# Patient Record
Sex: FEMALE | Race: WHITE
[De-identification: names, ages, dates, MRNs, and addresses within clinical notes are randomized per-mention and may not be internally consistent; named-entity substitution may affect disease eponyms.]

---

## 2018-01-25 ENCOUNTER — HOSPITAL ENCOUNTER (OUTPATIENT)
Dept: HOSPITAL 17 - ESDC | Age: 43
Discharge: HOME | End: 2018-01-25
Attending: SURGERY
Payer: COMMERCIAL

## 2018-01-25 DIAGNOSIS — K25.9: ICD-10-CM

## 2018-01-25 DIAGNOSIS — K21.9: Primary | ICD-10-CM

## 2018-01-25 PROCEDURE — 43239 EGD BIOPSY SINGLE/MULTIPLE: CPT

## 2018-01-25 PROCEDURE — 00731 ANES UPR GI NDSC PX NOS: CPT

## 2018-01-25 PROCEDURE — 88305 TISSUE EXAM BY PATHOLOGIST: CPT

## 2018-01-25 NOTE — GIPROC
[facility]

[address] [facility], [address]

 

 

EGD PROCEDURE REPORT     EXAM DATE: 01/25/2018

 

PATIENT NAME:      Edgar Finney           MR #:      [id number]

YOB: 1975      VISIT #:     [id number]

ATTENDING:     Andi Calderón MD     ORDER #:     [id number]

ASSISTANT:      none          STATUS:     outpatient

 

INDICATIONS:  The patient is a 42 yr old female here for an EGD due to follow up

on ulcer and history of esophageal reflux. Hx of egd 4 years ago.

PROCEDURE PERFORMED:     EGD w/ biopsy

MEDICATIONS:     None and Per Anesthesia.

TOPICAL ANESTHETIC:     none

 

CONSENT: The patient understands the risks and benefits of the procedure and

understands that these risks include, but are not limited to: sedation,

allergic reaction, infection, perforation and/or bleeding. Alternative means of

evaluation and treatment include, among others: physical exam, x-rays, and/or

surgical intervention. The patient elects to proceed with this endoscopic

procedure.

 



medical equipment was checked for proper function. Hand hygiene and appropriate

measures for infection prevention was taken. After the risks, benefits and

alternatives of the procedure were thoroughly explained, Informed consent was

verified, confirmed and timeout was successfully executed by the treatment

team. The patient was anesthetized with topical anesthesia and the EC-2990i

([id number]) endoscope was introduced through the mouth and advanced to the second

portion of the duodenum.  Retroflexed views revealed no abnormalities  The

gastroscope was then slowly withdrawn and removed.

No evidence of ulcer .

 

 

 

ADVERSE EVENTS:     There were no complications.

IMPRESSIONS:     Retroflexed views revealed no abnormalities

 

RECOMMENDATIONS:     Await biopsy results.  Biopsy results will not be ready for

7-10 days.  If you don't hear from us in two weeks, call our office for biopsy

results.

PATIENT CONDITION:     fair

DISPOSITION:     Home

REPEAT EXAM:     NONE

 

 

___________________________________

Andi Calderón MD

eSigned:  Andi Calderón MD 01/25/2018 9:39 AM

 

 

cc: Andi Calderón MD

## 2018-03-19 ENCOUNTER — HOSPITAL ENCOUNTER (INPATIENT)
Dept: HOSPITAL 17 - HSDI | Age: 43
LOS: 1 days | Discharge: HOME | DRG: 621 | End: 2018-03-20
Attending: SURGERY | Admitting: SURGERY
Payer: COMMERCIAL

## 2018-03-19 VITALS — HEIGHT: 64 IN | WEIGHT: 258.6 LBS | BODY MASS INDEX: 44.15 KG/M2

## 2018-03-19 VITALS
TEMPERATURE: 96.7 F | DIASTOLIC BLOOD PRESSURE: 79 MMHG | OXYGEN SATURATION: 100 % | HEART RATE: 72 BPM | SYSTOLIC BLOOD PRESSURE: 124 MMHG | RESPIRATION RATE: 18 BRPM

## 2018-03-19 VITALS
OXYGEN SATURATION: 98 % | RESPIRATION RATE: 18 BRPM | HEART RATE: 70 BPM | TEMPERATURE: 98.7 F | DIASTOLIC BLOOD PRESSURE: 74 MMHG | SYSTOLIC BLOOD PRESSURE: 123 MMHG

## 2018-03-19 VITALS — OXYGEN SATURATION: 98 %

## 2018-03-19 DIAGNOSIS — E66.01: Primary | ICD-10-CM

## 2018-03-19 DIAGNOSIS — K21.9: ICD-10-CM

## 2018-03-19 DIAGNOSIS — R73.03: ICD-10-CM

## 2018-03-19 DIAGNOSIS — M06.9: ICD-10-CM

## 2018-03-19 DIAGNOSIS — E78.00: ICD-10-CM

## 2018-03-19 DIAGNOSIS — Z23: ICD-10-CM

## 2018-03-19 PROCEDURE — 90732 PPSV23 VACC 2 YRS+ SUBQ/IM: CPT

## 2018-03-19 PROCEDURE — 0D164ZA BYPASS STOMACH TO JEJUNUM, PERCUTANEOUS ENDOSCOPIC APPROACH: ICD-10-PCS | Performed by: SURGERY

## 2018-03-19 PROCEDURE — 90686 IIV4 VACC NO PRSV 0.5 ML IM: CPT

## 2018-03-19 PROCEDURE — 80048 BASIC METABOLIC PNL TOTAL CA: CPT

## 2018-03-19 PROCEDURE — 83735 ASSAY OF MAGNESIUM: CPT

## 2018-03-19 PROCEDURE — 94150 VITAL CAPACITY TEST: CPT

## 2018-03-19 PROCEDURE — 82948 REAGENT STRIP/BLOOD GLUCOSE: CPT

## 2018-03-19 PROCEDURE — 85025 COMPLETE CBC W/AUTO DIFF WBC: CPT

## 2018-03-19 RX ADMIN — POTASSIUM CHLORIDE, DEXTROSE MONOHYDRATE AND SODIUM CHLORIDE SCH MLS/HR: 150; 5; 450 INJECTION, SOLUTION INTRAVENOUS at 10:30

## 2018-03-19 RX ADMIN — Medication SCH ML: at 21:00

## 2018-03-19 RX ADMIN — SODIUM CHLORIDE SCH MG: 900 INJECTION, SOLUTION INTRAVENOUS at 20:16

## 2018-03-19 RX ADMIN — POTASSIUM CHLORIDE, DEXTROSE MONOHYDRATE AND SODIUM CHLORIDE SCH MLS/HR: 150; 5; 450 INJECTION, SOLUTION INTRAVENOUS at 18:38

## 2018-03-19 RX ADMIN — SODIUM CHLORIDE SCH MLS/HR: 900 INJECTION, SOLUTION INTRAVENOUS at 16:47

## 2018-03-19 RX ADMIN — ACETAMINOPHEN SCH MLS/HR: 10 INJECTION, SOLUTION INTRAVENOUS at 18:28

## 2018-03-19 RX ADMIN — ACETAMINOPHEN SCH MLS/HR: 10 INJECTION, SOLUTION INTRAVENOUS at 11:34

## 2018-03-19 RX ADMIN — PANTOPRAZOLE SCH MG: 40 TABLET, DELAYED RELEASE ORAL at 16:02

## 2018-03-19 RX ADMIN — SODIUM CHLORIDE SCH MG: 900 INJECTION, SOLUTION INTRAVENOUS at 16:03

## 2018-03-19 NOTE — HHI.PR
__________________________________________________





Immediate Post Op Note


Procedure Date:


Mar 19, 2018


Pre Op Diagnosis:  


morbid obesity, bmi 44, hypercholestremia, gerd, PreDM, RA


Post Op Diagnosis:  


same


Surgeon:


Andi Calderón MD


Assistant(s):


Dr. Ortiz


Procedure:


lap RYGB


Findings:


no leak with methylene blue, redundant GJ tissue resected


Complications:


none


Specimen(s) removed:


none


Estimated blood loss:


10cc


Anesthesia:  General


Drains:  None


Patient to:  PACU


Patient Condition:  Good











Andi Calderón MD Mar 19, 2018 09:43

## 2018-03-20 VITALS
TEMPERATURE: 96.8 F | HEART RATE: 74 BPM | SYSTOLIC BLOOD PRESSURE: 134 MMHG | OXYGEN SATURATION: 97 % | DIASTOLIC BLOOD PRESSURE: 73 MMHG | RESPIRATION RATE: 18 BRPM

## 2018-03-20 VITALS
DIASTOLIC BLOOD PRESSURE: 84 MMHG | RESPIRATION RATE: 18 BRPM | TEMPERATURE: 98.4 F | SYSTOLIC BLOOD PRESSURE: 139 MMHG | OXYGEN SATURATION: 96 % | HEART RATE: 74 BPM

## 2018-03-20 VITALS
OXYGEN SATURATION: 95 % | HEART RATE: 73 BPM | SYSTOLIC BLOOD PRESSURE: 117 MMHG | DIASTOLIC BLOOD PRESSURE: 65 MMHG | TEMPERATURE: 98.3 F | RESPIRATION RATE: 18 BRPM

## 2018-03-20 VITALS
RESPIRATION RATE: 18 BRPM | TEMPERATURE: 98.8 F | SYSTOLIC BLOOD PRESSURE: 140 MMHG | HEART RATE: 65 BPM | OXYGEN SATURATION: 97 % | DIASTOLIC BLOOD PRESSURE: 76 MMHG

## 2018-03-20 VITALS
RESPIRATION RATE: 18 BRPM | OXYGEN SATURATION: 96 % | DIASTOLIC BLOOD PRESSURE: 73 MMHG | HEART RATE: 81 BPM | SYSTOLIC BLOOD PRESSURE: 128 MMHG | TEMPERATURE: 99.9 F

## 2018-03-20 LAB
BASOPHILS # BLD AUTO: 0 TH/MM3 (ref 0–0.2)
BASOPHILS NFR BLD: 0.1 % (ref 0–2)
BUN SERPL-MCNC: 3 MG/DL (ref 7–18)
CALCIUM SERPL-MCNC: 8.5 MG/DL (ref 8.5–10.1)
CHLORIDE SERPL-SCNC: 104 MEQ/L (ref 98–107)
CREAT SERPL-MCNC: 0.64 MG/DL (ref 0.5–1)
EOSINOPHIL # BLD: 0 TH/MM3 (ref 0–0.4)
EOSINOPHIL NFR BLD: 0.1 % (ref 0–4)
ERYTHROCYTE [DISTWIDTH] IN BLOOD BY AUTOMATED COUNT: 12.5 % (ref 11.6–17.2)
GFR SERPLBLD BASED ON 1.73 SQ M-ARVRAT: 102 ML/MIN (ref 89–?)
GLUCOSE SERPL-MCNC: 125 MG/DL (ref 74–106)
HCO3 BLD-SCNC: 21.8 MEQ/L (ref 21–32)
HCT VFR BLD CALC: 37.9 % (ref 35–46)
HGB BLD-MCNC: 12.6 GM/DL (ref 11.6–15.3)
LYMPHOCYTES # BLD AUTO: 1.3 TH/MM3 (ref 1–4.8)
LYMPHOCYTES NFR BLD AUTO: 11.5 % (ref 9–44)
MAGNESIUM SERPL-MCNC: 2.1 MG/DL (ref 1.5–2.5)
MCH RBC QN AUTO: 31.2 PG (ref 27–34)
MCHC RBC AUTO-ENTMCNC: 33.1 % (ref 32–36)
MCV RBC AUTO: 94.4 FL (ref 80–100)
MONOCYTE #: 0.8 TH/MM3 (ref 0–0.9)
MONOCYTES NFR BLD: 6.9 % (ref 0–8)
NEUTROPHILS # BLD AUTO: 9.5 TH/MM3 (ref 1.8–7.7)
NEUTROPHILS NFR BLD AUTO: 81.4 % (ref 16–70)
PLATELET # BLD: 248 TH/MM3 (ref 150–450)
PMV BLD AUTO: 9.1 FL (ref 7–11)
RBC # BLD AUTO: 4.02 MIL/MM3 (ref 4–5.3)
SODIUM SERPL-SCNC: 135 MEQ/L (ref 136–145)
WBC # BLD AUTO: 11.7 TH/MM3 (ref 4–11)

## 2018-03-20 RX ADMIN — SODIUM CHLORIDE SCH MLS/HR: 900 INJECTION, SOLUTION INTRAVENOUS at 08:25

## 2018-03-20 RX ADMIN — SODIUM CHLORIDE SCH MG: 900 INJECTION, SOLUTION INTRAVENOUS at 08:28

## 2018-03-20 RX ADMIN — POTASSIUM CHLORIDE, DEXTROSE MONOHYDRATE AND SODIUM CHLORIDE SCH MLS/HR: 150; 5; 450 INJECTION, SOLUTION INTRAVENOUS at 03:50

## 2018-03-20 RX ADMIN — SODIUM CHLORIDE SCH MG: 900 INJECTION, SOLUTION INTRAVENOUS at 03:50

## 2018-03-20 RX ADMIN — SODIUM CHLORIDE SCH MLS/HR: 900 INJECTION, SOLUTION INTRAVENOUS at 01:32

## 2018-03-20 RX ADMIN — PANTOPRAZOLE SCH MG: 40 TABLET, DELAYED RELEASE ORAL at 08:26

## 2018-03-20 RX ADMIN — POTASSIUM CHLORIDE, DEXTROSE MONOHYDRATE AND SODIUM CHLORIDE SCH MLS/HR: 150; 5; 450 INJECTION, SOLUTION INTRAVENOUS at 11:51

## 2018-03-20 RX ADMIN — ACETAMINOPHEN SCH MLS/HR: 10 INJECTION, SOLUTION INTRAVENOUS at 00:00

## 2018-03-20 RX ADMIN — ACETAMINOPHEN SCH MLS/HR: 10 INJECTION, SOLUTION INTRAVENOUS at 03:51

## 2018-03-20 RX ADMIN — Medication SCH ML: at 08:31

## 2018-03-20 NOTE — MP
cc:

Andi Calderón MD

****

 

 

DATE OF OPERATION:

03/19/2018

 

DATE OF PROCEDURE:

03/19/2018

 

PREOPERATIVE DIAGNOSIS:

Morbid obesity, body mass index 44, hypercholesteremia, prediabetes, 

reflux disease, rheumatoid arthritis.

 

POSTOPERATIVE DIAGNOSIS:

Morbid obesity, body mass index 44, hypercholesteremia, prediabetes, 

reflux disease, rheumatoid arthritis.

 

PROCEDURE PERFORMED:

Laparoscopic Kavita-en-Y gastric bypass.

 

SURGEON:

Andi Calderón MD

 

ASSISTANT:

Dr. Dominick Jeff needed due to complexity of laparoscopic case.  

Dr. Jeff assisted in chemical control and retraction.

 

ANESTHESIA:

GETA.

 

INTRAVENOUS FLUIDS:

See anesthesia.

 

ESTIMATED BLOOD LOSS:

10 mL.

 

DRAINS:

None.

 

COMPLICATIONS:

None.

 

FINDINGS:

No evidence of leak with the methylene blue.  Small redundant tissue 

of gastrojejunostomy resected.  Kavita limb of 40 cm and a 

biliopancreatic limb of 100 cm.

 

SPECIMENS:

None.

 

INDICATIONS FOR PROCEDURE:

The patient is a 43-year-old female who presents with morbid obesity, 

BMI of 44, multiple comorbidities including prediabetes, reflux, 

arthritis and hypercholesterolemia.  She had multiple attempts at 

weight loss without success.  Therefore, decision was made for 

bariatric surgery.  Discussed in detail.

 

DETAILS OF THE PROCEDURE:

The patient was taken to the operating room and placed in supine 

position.  She was prepped and draped in usual sterile fashion after 

induction of general endotracheal anesthesia.  Brief timeout done, 

stating correct patient, procedure, surgical site.  We were all in 

agreement with this.  Attention first directed to 18 cm from the 

xiphoid.  Local anesthetic injected, Marcaine with epinephrine.  A 5 

mm incision was made just off the left of midline.  A 5 mm Optiview 

port was done under direct visualization.  Pneumoperitoneum was 

induced.  This was done to 15 mm pneumoperitoneum.  On visual 

inspection, no evidence of injury.  Several other trocars were placed.

 A right upper quadrant 5 mm which would be used for liver retraction 

and 12 mm right lower quadrant, placed, 12 mm left lower quadrant port

placed and a 5 mm left upper quadrant placed.  This was all done under

direct visualization prior to her placement of injection and local 

anesthetic.  Attention was directed to the omentum.  Electro Bovie 

cautery was used, harmonic scalpel to ligate the omentum off the 

transverse colon.  This was done in order to split the omentum.  This 

was done up to the hepatic flexure.  Next, the ligament of Treitz was 

identified and walked 40 cm distal.  The small bowel was divided using

Ali Molina Flex Endo GI stapler with Seamguard.  Next, a clip was placed 

in the proximal portion of the limb.  The distal limb was further 

walked another distance of 100 cm distally.  Two enterotomies were 

created on the anterior mesenteric border of the biliopancreatic limb,

in the previously transected portion of the limb.  This was done in 

order to fashion a jejunojejunostomy.  Linear KAMRYN stapler was used to 

create a through-and-through layer.  This was done with a side 

stapling manner.  Next, the common enterotomy was grasped with 

[place] graspers and another Endo-KAMRYN stapler was placed, used to 

approximate the conjoint enterotomy.  Small clips were placed at the 

staple line to achieve hemostasis.  Next, we used 2-0 silk, the 

redundant bowel was sutured with a Lapra-Ty.  Once we finished this, 

the patient was placed in reverse Trendelenburg and the left side was 

placed up.  Kerrie-Flex retractor was placed.  The upper lobe of the 

liver was retracted.  The angle of His was taken down using Harmonic 

scalpel.  A 5 mm camera port was placed approximately 5 cm from the GE

junction to the lesser curvature.  The sac was entered using Harmonic 

scalpel and blunt dissection.  A linear Endo-KAMRYN stapler was used to 

transect horizontally using a blue load stapler.  We did confirm no NG

tube or esophageal probes were placed.  Further pouch was created by 

heading cephalad, firing 2 more Endo-KAMRYN staple loads near the angle 

of His.  This was divided in the stomach completely.  Next, gastrotomy

was created using hook Harmonic and hook electric Bovie cautery.  An 

enterotomy was placed to the Kavita limb, the jejunal side as well.  The

Endo-KAMRYN stapler was used to staple and approximate the jejunal limb 

of the gastric pouch approximately 2 cm.  There was noted to be some 

redundancy upon doing this.  Therefore, the mesentery was taken down 

for another centimeter and a half and this extra redundant tissue AK 

would be noted as a candy cane portion was transected closer proximal 

to the gastrojejunostomy.  Next, gastric pouch was approximated using 

2-0 Polysorb in a running fashion to approximate the enterotomy.  

Mid-way through the suture was cut and 18-Ivorian OG tube was advanced 

past the anastomosis.  The defect was then closed with a second 

running 2-0 Polysorb suture.  These were tied together, creating a 

single layer.  Single layer was then tested with methylene blue 

without evidence of leak.  Next, a second layer was run using Lapra-Ty

in order to reinforce and reapproximate the gastrojejunostomy.

Next, Evicel was placed over the gastrojejunostomy and 

jejunojejunostomy on the staple lines.  Next, the defects were closed 

to prevent internal hernias, as this was done with a permanent 2-0 

Surgidac.  Minimal bleeding point noted at the Surgidac site.  Next, 

the two 12 mm port sites were removed.  The liver retractor was 

removed as well.  Pneumoperitoneum was removed.  OG tube was removed. 

Subcuticular sutures were done at all port sites with 4-0 Monocryl.  

Sterile dressings were then placed.  The patient tolerated the 

procedure well.  There were no intraoperative complications.  All lap 

and instrument counts were correct at the end of the procedure.  The 

patient was extubated and taken stable to PACU.

 

 

__________________________________

MD ANDRZEJ Haywood/HÉCTOR

D: 03/19/2018, 04:20 PM

T: 03/19/2018, 06:22 PM

Visit #: [id number]

Job #: [id number]

## 2018-03-20 NOTE — HHI.PR
__________________________________________________





Subjective


Subjective Notes


C/O some mild nausea but well controlled by antiemetics


Pain well controlled





Objective


Vitals/I&O





Vital Signs








  Date Time  Temp Pulse Resp B/P (MAP) Pulse Ox O2 Delivery O2 Flow Rate FiO2


 


3/20/18 08:00 98.4 74 18 139/84 (102) 96   


 


3/19/18 21:27        21


 


3/19/18 12:30      Nasal Cannula 2 








Labs





Laboratory Tests








Test


  3/20/18


04:53


 


White Blood Count 11.7 


 


Red Blood Count 4.02 


 


Hemoglobin 12.6 


 


Hematocrit 37.9 


 


Mean Corpuscular Volume 94.4 


 


Mean Corpuscular Hemoglobin 31.2 


 


Mean Corpuscular Hemoglobin


Concent 33.1 


 


 


Red Cell Distribution Width 12.5 


 


Platelet Count 248 


 


Mean Platelet Volume 9.1 


 


Neutrophils (%) (Auto) 81.4 


 


Lymphocytes (%) (Auto) 11.5 


 


Monocytes (%) (Auto) 6.9 


 


Eosinophils (%) (Auto) 0.1 


 


Basophils (%) (Auto) 0.1 


 


Neutrophils # (Auto) 9.5 


 


Lymphocytes # (Auto) 1.3 


 


Monocytes # (Auto) 0.8 


 


Eosinophils # (Auto) 0.0 


 


Basophils # (Auto) 0.0 


 


CBC Comment DIFF FINAL 


 


Differential Comment  


 


Blood Urea Nitrogen 3 


 


Creatinine 0.64 


 


Random Glucose 125 


 


Calcium Level 8.5 


 


Magnesium Level 2.1 


 


Sodium Level 135 


 


Potassium Level 4.0 


 


Chloride Level 104 


 


Carbon Dioxide Level 21.8 


 


Anion Gap 9 


 


Estimat Glomerular Filtration


Rate 102 


 








Cardiovascular:  Regular


Lungs:  Clear


Abdomen:  Post-op tenderness


Extremities:  Perfused


Wound


Wound :  


   Wound Location:  Abdomen


   Appearance:  Clean & Dry





A/P


Assessment and Plan


42yo F POD#1 Laparoscopic RnY





-Continue to increase fluids as tolerated


-Continue with frequent ambulation





Attending Statement


patient seen at bedside


slow progress but doing well


dvt ppx


bariatric liquid diet


oob


pain control





Attestation


The exam, history, and the medical decision-making described in the above note 

were completed with the assistance of the mid-level provider. I reviewed and 

agree with the findings presented.  I attest that I had a face-to-face 

encounter with the patient on the same day, and personally performed and 

documented my assessment and findings in the medical record.











Jez Clancy Mar 20, 2018 09:59


Andi Calderón MD Mar 25, 2018 06:20

## 2022-02-07 ENCOUNTER — APPOINTMENT (RX ONLY)
Dept: URBAN - METROPOLITAN AREA CLINIC 77 | Facility: CLINIC | Age: 47
Setting detail: DERMATOLOGY
End: 2022-02-07

## 2022-02-07 DIAGNOSIS — L28.0 LICHEN SIMPLEX CHRONICUS: ICD-10-CM

## 2022-02-07 DIAGNOSIS — B35.1 TINEA UNGUIUM: ICD-10-CM

## 2022-02-07 PROCEDURE — ? COUNSELING

## 2022-02-07 PROCEDURE — ? PRESCRIPTION

## 2022-02-07 PROCEDURE — ? PRESCRIPTION MEDICATION MANAGEMENT

## 2022-02-07 PROCEDURE — 99204 OFFICE O/P NEW MOD 45 MIN: CPT

## 2022-02-07 PROCEDURE — ? ADDITIONAL NOTES

## 2022-02-07 RX ORDER — KETOCONAZOLE 20 MG/G
1 CREAM TOPICAL BID
Qty: 60 | Refills: 1 | Status: ERX | COMMUNITY
Start: 2022-02-07

## 2022-02-07 RX ORDER — CLOBETASOL PROPIONATE 0.5 MG/G
1 OINTMENT TOPICAL BID
Qty: 60 | Refills: 1 | Status: ERX | COMMUNITY
Start: 2022-02-07

## 2022-02-07 RX ADMIN — KETOCONAZOLE 1: 20 CREAM TOPICAL at 00:00

## 2022-02-07 RX ADMIN — CLOBETASOL PROPIONATE 1: 0.5 OINTMENT TOPICAL at 00:00

## 2022-02-07 ASSESSMENT — LOCATION DETAILED DESCRIPTION DERM
LOCATION DETAILED: RIGHT DORSAL FOOT
LOCATION DETAILED: RIGHT 5TH TOENAIL
LOCATION DETAILED: LEFT DORSAL FOOT

## 2022-02-07 ASSESSMENT — LOCATION SIMPLE DESCRIPTION DERM
LOCATION SIMPLE: RIGHT FOOT
LOCATION SIMPLE: RIGHT 5TH TOE
LOCATION SIMPLE: LEFT FOOT

## 2022-02-07 ASSESSMENT — LOCATION ZONE DERM
LOCATION ZONE: TOENAIL
LOCATION ZONE: FEET

## 2022-02-07 NOTE — PROCEDURE: PRESCRIPTION MEDICATION MANAGEMENT
Detail Level: Zone
Render In Strict Bullet Format?: No
Initiate Treatment: Clobetesol Ointment Bid
Initiate Treatment: Ketoconazole cream Bid